# Patient Record
Sex: FEMALE | Race: WHITE | NOT HISPANIC OR LATINO | Employment: FULL TIME | URBAN - NONMETROPOLITAN AREA
[De-identification: names, ages, dates, MRNs, and addresses within clinical notes are randomized per-mention and may not be internally consistent; named-entity substitution may affect disease eponyms.]

---

## 2023-09-22 ENCOUNTER — TRANSCRIBE ORDERS (OUTPATIENT)
Dept: CARDIOLOGY | Facility: CLINIC | Age: 53
End: 2023-09-22
Payer: COMMERCIAL

## 2023-09-22 DIAGNOSIS — R06.02 SHORTNESS OF BREATH: ICD-10-CM

## 2023-09-22 DIAGNOSIS — R00.2 PALPITATIONS: ICD-10-CM

## 2023-10-05 PROBLEM — E66.3 OVERWEIGHT WITH BODY MASS INDEX (BMI) OF 27 TO 27.9 IN ADULT: Status: ACTIVE | Noted: 2023-10-05

## 2023-10-05 PROBLEM — J45.909 ASTHMA (HHS-HCC): Status: ACTIVE | Noted: 2023-10-05

## 2023-10-05 PROBLEM — R06.00 DYSPNEA: Status: ACTIVE | Noted: 2023-10-05

## 2023-10-05 PROBLEM — R00.2 PALPITATION: Status: ACTIVE | Noted: 2023-10-05

## 2023-10-05 RX ORDER — PHENOL/SODIUM PHENOLATE
1 AEROSOL, SPRAY (ML) MUCOUS MEMBRANE DAILY
COMMUNITY

## 2023-10-05 RX ORDER — CHOLECALCIFEROL (VITAMIN D3) 50 MCG
2000 TABLET ORAL DAILY
COMMUNITY

## 2023-10-05 RX ORDER — FLUTICASONE PROPIONATE AND SALMETEROL 250; 50 UG/1; UG/1
1 POWDER RESPIRATORY (INHALATION) EVERY 12 HOURS
COMMUNITY

## 2023-10-05 RX ORDER — LANOLIN ALCOHOL/MO/W.PET/CERES
1000 CREAM (GRAM) TOPICAL DAILY
COMMUNITY

## 2023-10-05 RX ORDER — TEZEPELUMAB-EKKO 210 MG/1.9ML
INJECTION, SOLUTION SUBCUTANEOUS
COMMUNITY

## 2023-10-06 ENCOUNTER — ANCILLARY PROCEDURE (OUTPATIENT)
Dept: CARDIOLOGY | Facility: CLINIC | Age: 53
End: 2023-10-06
Payer: COMMERCIAL

## 2023-10-06 DIAGNOSIS — R06.02 SHORTNESS OF BREATH: ICD-10-CM

## 2023-10-06 DIAGNOSIS — R00.2 PALPITATIONS: ICD-10-CM

## 2023-10-06 PROCEDURE — 93268 ECG RECORD/REVIEW: CPT | Performed by: INTERNAL MEDICINE

## 2023-11-17 ENCOUNTER — APPOINTMENT (OUTPATIENT)
Dept: CARDIOLOGY | Facility: CLINIC | Age: 53
End: 2023-11-17
Payer: COMMERCIAL

## 2023-11-29 ENCOUNTER — OFFICE VISIT (OUTPATIENT)
Dept: CARDIOLOGY | Facility: CLINIC | Age: 53
End: 2023-11-29
Payer: COMMERCIAL

## 2023-11-29 VITALS
BODY MASS INDEX: 28 KG/M2 | HEIGHT: 63 IN | HEART RATE: 80 BPM | WEIGHT: 158 LBS | DIASTOLIC BLOOD PRESSURE: 76 MMHG | SYSTOLIC BLOOD PRESSURE: 118 MMHG

## 2023-11-29 DIAGNOSIS — E66.3 OVERWEIGHT WITH BODY MASS INDEX (BMI) OF 27 TO 27.9 IN ADULT: ICD-10-CM

## 2023-11-29 DIAGNOSIS — R00.2 PALPITATION: Primary | ICD-10-CM

## 2023-11-29 DIAGNOSIS — R06.02 SHORTNESS OF BREATH: ICD-10-CM

## 2023-11-29 PROCEDURE — 99213 OFFICE O/P EST LOW 20 MIN: CPT | Performed by: NURSE PRACTITIONER

## 2023-11-29 PROCEDURE — 3008F BODY MASS INDEX DOCD: CPT | Performed by: NURSE PRACTITIONER

## 2023-11-29 PROCEDURE — 1036F TOBACCO NON-USER: CPT | Performed by: NURSE PRACTITIONER

## 2023-11-29 NOTE — PATIENT INSTRUCTIONS
Please bring all medicines, vitamins, and herbal supplements with you when you come to the office.    Prescriptions will not be filled unless you are compliant with your follow up appointments or have a follow up appointment scheduled as per instruction of your physician. Refills should be requested at the time of your visit.    PLAN:   Through informed decision making process incorporating patients unique circumstances, the following treatment plan will be initiated:    1.  Prescription drug management of cardiovascular medication for efficacy, adherence to treatment, side effect assessment and polypharmacy. Current treatment clinically warranted and to continue without modifications.    2.  Calcium Score (family history, shortness of breath)    3.  Try Magnesium Oxide 250mg take twice a day    4. Return for follow-up; in the interim, contact the office if new symptoms arise.  Dr. Gonsales 6 month

## 2023-12-02 ENCOUNTER — ANCILLARY PROCEDURE (OUTPATIENT)
Dept: RADIOLOGY | Facility: CLINIC | Age: 53
End: 2023-12-02
Payer: COMMERCIAL

## 2023-12-02 DIAGNOSIS — R06.02 SHORTNESS OF BREATH: ICD-10-CM

## 2023-12-02 PROCEDURE — 75571 CT HRT W/O DYE W/CA TEST: CPT

## 2023-12-02 ASSESSMENT — ENCOUNTER SYMPTOMS
ORTHOPNEA: 0
SYNCOPE: 0
PALPITATIONS: 0
IRREGULAR HEARTBEAT: 0
NEAR-SYNCOPE: 0
PND: 0
DYSPNEA ON EXERTION: 0

## 2023-12-02 NOTE — PROGRESS NOTES
"Chief Complaint  \" I just finished steroids from COVID\"    Reason for Visit  Testing follow-up.  Patient presents to the office today for outpatient follow-up.  Last evaluated in clinic by Dr. Altamirano September 2023.  At that time she was seen due to complaints of dyspnea and palpitations.  Subsequent BTNP 23, Mark of Hearts unremarkable.    Presents today ambulatory with steady gait.  Accompanied by patient  Patient denies any hospitalizations or significant changes to interval medical history since last office follow-up.     History of Present Illness   Pleasant 53-year-old female who presents to the office today for testing follow-up.  She has recently recovered from outpatient COVID illness, no adverse sequela.    She has started a new job with noted significant decrease in overall stress level.  She has noted that her anxiety is improving and palpitations have significantly declined.  She had one episode of palpitations where her heart rate was noted to be high at 4 AM on her Apple Watch.  Dyspnea noncardiac with BTNP 23.  Has history of asthma    An activity standpoint she completes ADLs, does a lot of lifting and going up and down ladders at her new job.  Does housework.  Denies exertional chest pain, no change in exercise capacity or functional tolerance.    No prior ischemic work-up:  August 2022 echo with no wall motion abnormality, EF 55%.    Cardiac risk profile:  Hypertension denies  Hyperlipidemia denies  Diabetes denies  Non-smoker  Positive family history of premature coronary artery disease in primary relatives.    Discussed availability of calcium score as an additional risk factor predictor, she is in agreement to proceed.  Otherwise, we discussed addition of OTC magnesium oxide for recurrent palpitations.  Reviewed recent Mark of VendAsta with no significant findings.    Patient reports that overall has no complaint(s) of chest pressure/discomfort, exertional chest pressure/discomfort, fatigue, " "irregular heart beat, lower extremity edema, orthopnea, and paroxysmal nocturnal dyspnea      Review of Systems   Cardiovascular:  Negative for chest pain, dyspnea on exertion, irregular heartbeat, leg swelling, near-syncope, orthopnea, palpitations, paroxysmal nocturnal dyspnea and syncope.        Visit Vitals  /76 (BP Location: Left arm, Patient Position: Sitting)   Pulse 80   Ht 1.6 m (5' 3\")   Wt 71.7 kg (158 lb)   BMI 27.99 kg/m²   Smoking Status Never   BSA 1.79 m²     Physical Exam  Vitals and nursing note reviewed.   HENT:      Head: Normocephalic.   Cardiovascular:      Rate and Rhythm: Normal rate and regular rhythm.      Heart sounds: Normal heart sounds.   Pulmonary:      Effort: Pulmonary effort is normal.      Breath sounds: Normal breath sounds.   Abdominal:      Palpations: Abdomen is soft.   Musculoskeletal:      Right lower leg: No edema.      Left lower leg: No edema.   Skin:     General: Skin is warm and dry.   Neurological:      General: No focal deficit present.      Mental Status: She is alert.   Psychiatric:         Mood and Affect: Mood normal.         Behavior: Behavior normal.        Allergies   Allergen Reactions    Bactrim [Sulfamethoxazole-Trimethoprim] Hives    Nitrofurantoin Monohyd/M-Cryst Hives    Naproxen Bleeding       Current Outpatient Medications   Medication Instructions    ascorbic acid (VITAMIN C) 100 mg, oral, Daily    cholecalciferol (VITAMIN D3) 2,000 Units, oral, Daily    cyanocobalamin (VITAMIN B-12) 1,000 mcg, oral, Daily    fluticasone propion-salmeteroL (Advair Diskus) 250-50 mcg/dose diskus inhaler 1 puff, inhalation, Every 12 hours    omeprazole (PriLOSEC) 20 mg tablet,delayed release (DR/EC) EC tablet 1 tablet, oral, Daily    tezepelumab-ekko (Tezspire) SubQ syringe 1 injection in office every 4 weeks <BR>      Assessment:    Palpitation  September 2023 30-day Mark of Nexercise  4 beat SVT at 129 bpm  11 beat  bpm  Complaints of palpitations associated " with PAC  Shortness of breath complaints sinus rhythm     Overweight with body mass index (BMI) of 27 to 27.9 in adult  Reviewed the merits of healthy lifestyle choices on overall cardiovascular health.      Shortness of breath  Sept 2023 BTNP 23  No correlation on Mark of Hearts with arrhythmia    Plan:     Through informed decision making process incorporating patients unique circumstances, the following treatment plan will be initiated:    1.  Prescription drug management of cardiovascular medication for efficacy, adherence to treatment, side effect assessment and polypharmacy. Current treatment clinically warranted and to continue without modifications.    2.  Calcium Score (family history, shortness of breath)    3.  Try Magnesium Oxide 250mg take twice a day    4. Return for follow-up; in the interim, contact the office if new symptoms arise.  Dr. Gonsales 6 month     Josefa Srinivasan  MSN, APRN-CNP, PMHNP-BC  [facility]    Please excuse any errors in grammar or translation related to this dictation. Voice recognition software was utilized to prepare this document.

## 2023-12-02 NOTE — ASSESSMENT & PLAN NOTE
September 2023 30-day Mark of Hearts  4 beat SVT at 129 bpm  11 beat  bpm  Complaints of palpitations associated with PAC  Shortness of breath complaints sinus rhythm

## 2023-12-04 ENCOUNTER — TELEPHONE (OUTPATIENT)
Dept: CARDIOLOGY | Facility: CLINIC | Age: 53
End: 2023-12-04
Payer: COMMERCIAL

## 2023-12-04 NOTE — TELEPHONE ENCOUNTER
----- Message from BASIL Osorio sent at 12/4/2023 12:08 PM EST -----  Please forward to PCP re: lung findings.  Let patient know her calcium score is zero; no additional testing   ----- Message -----  From: Interface, Radiology Results In  Sent: 12/4/2023  10:42 AM EST  To: BSAIL Osorio

## 2023-12-04 NOTE — TELEPHONE ENCOUNTER
Result Communication    Resulted Orders   CT cardiac scoring wo IV contrast    Addendum: 12/4/2023    Interpreted By:  Twan Stephenson,   ADDENDUM:  NON-CARDIOVASCULAR FINDINGS      INCLUDED LUNGS, AIRWAYS AND PLEURA  Endotracheal / endobronchial lesion: Negative  Nodule: Negative  Airspace disease: Negative  Pleural effusion: Negative  Pneumothorax: Negative  Other: Curvilinear bands of atelectasis or scarring in both lower  lobes. Three 6 mm or smaller subpleural left lower lobe nodules  versus foci of pleural thickening, few in the right lower lobe as  well. Favor simply atelectasis      INCLUDED NON-CARDIOVASCULAR INDIRA AND MEDIASTINUM  Adenopathy: Negative  Included esophagus: Unremarkable      Other: No acute or contributory unanticipated findings      INCLUDED BONES: No acute skeletal findings, noting less sensitivity  and specificity without dedicated sagittal and coronal reformatted  series.      INCLUDED CHEST WALL  No acute or contributory unanticipated findings      INCLUDED UPPER ABDOMEN  No acute or contributory unanticipated findings      -------      NON-CARDIOVASCULAR IMPRESSION      GIVEN THAT THEY ARE ALL DEPENDENT IN LOCATION, I SUSPECT SIMPLY A FEW  TINY AREAS OF ATELECTASIS RATHER THAN LESS THAN 6 MM SUBPLEURAL BASED  OR PLEURAL-BASED BILATERAL LOWER LOBE NODULES.  ACCORDING TO 2017  REVISION LUNG NODULE FOLLOW-UP GUIDELINES, ONE YEAR FOLLOW-UP CHEST  CT WOULD BE OPTIONAL, EVEN IN A HIGH RISK PATIENT (SMOKING OR  MALIGNANCY HISTORY); HOWEVER, THOSE RECOMMENDATIONS ARE BASED ON  REVIEW OF A COMPLETE CHEST CT. THE NEED, IF ANY, FOR NEAR TERM  FOLLOW-UP COMPLETE CHEST CT SHOULD BE BASED ON CLINICAL/RISK FACTORS      NOTE THIS ADDENDUM IS SOLELY FOR INTERPRETATION OF ANATOMY OUTSIDE  THE CARDIOVASCULAR SYSTEM. INTERPRETATION OF AND REPORTING OF THE  CARDIOVASCULAR STRUCTURES ARE THE SOLE RESPONSIBILITY OF THE  CARDIOLOGIST SUBMITTING THE ORIGINAL REPORT (NOT THIS ADDENDUM)      Signed by: Twan Stephenson  "12/4/2023 10:48 AM      -------- ORIGINAL REPORT --------  Dictation workstation:   [id number]      Narrative    Interpreted By:  Feliz Schumacher,   STUDY:  CT CARDIAC SCORING WO IV CONTRAST;  12/2/2023 12:40 pm      INDICATION:  Signs/Symptoms:shortness of breath, family history.      COMPARISON:  None.      ACCESSION NUMBER(S):  [id number]      ORDERING CLINICIAN:  FLORENCIA SANDRA      TECHNIQUE:  Using prospective ECG gating, CT scan of the coronary arteries was  performed without intravenous contrast. Coronary calcium scoring  was  performed according to the method of Agatston.      CT Dose-Length Product (DLP): 62.5 mGy*cm  CT Dose Reduction Employed: Yes, prospective gating, iterative  reconstruction.      FINDINGS:  The score and distribution of calcium in the coronary arteries is as  follows:      LM             0  LAD           0  LCx            0  RCA           0      Total         0      The visualized mid/lower ascending thoracic aorta measures 2.9 cm in  diameter. The heart is normal in size. No pericardial effusion is  present.        Impression    1. Coronary artery calcium score of  0*.      *Coronary artery calcium scoring may be helpful in predicting the  risk for future coronary heart disease events.  According to the  American College of Cardiology Foundation Clinical Expert Consensus  Task Force, such testing provides important prognostic information in  patients with more than one coronary heart disease risk factor. The  coronary artery calcium score correlates with the annual risk of a  non-fatal myocardial infarction or coronary heart disease death.      Coronary artery score            Annual Risk      0-99                               0.4%  100-399                          1.3%  >400                              2.4%      These three \"breakpoints\" correspond to lower, intermediate and high  risk states for future coronary events.  Such information should be  used, along with appropriate " clinical judgment, to make decisions  regarding the intensity of risk factor management strategies to treat  blood lipids and to modify other non-lipid coronary risk factors.      Reference: Celeste P et al. Circulation.  2007; 115:402-426      Reading Cardiologist: Dr. Felzi Schumacher, Date:  12/4/2023  10:40 am      Signed by: Feliz Schumacher 12/4/2023 10:40 AM  Dictation workstation:   [id number]       3:24 PM      Results were successfully communicated with the patient and they acknowledged their understanding. Faxed results to Dr Contreras Madrigal per patients request.

## 2024-01-09 ENCOUNTER — TELEPHONE (OUTPATIENT)
Dept: CARDIOLOGY | Facility: CLINIC | Age: 54
End: 2024-01-09
Payer: COMMERCIAL

## 2024-01-09 NOTE — TELEPHONE ENCOUNTER
Patient phoned asking to  calcium score testing results due to never forward over to Dr. Dinesh Madrigal. Sent message to [facility] office to print for patient for .

## 2024-06-19 ENCOUNTER — APPOINTMENT (OUTPATIENT)
Dept: CARDIOLOGY | Facility: CLINIC | Age: 54
End: 2024-06-19
Payer: COMMERCIAL

## 2024-06-19 VITALS
WEIGHT: 159 LBS | HEART RATE: 76 BPM | DIASTOLIC BLOOD PRESSURE: 66 MMHG | HEIGHT: 63 IN | BODY MASS INDEX: 28.17 KG/M2 | SYSTOLIC BLOOD PRESSURE: 110 MMHG

## 2024-06-19 DIAGNOSIS — R00.2 PALPITATION: ICD-10-CM

## 2024-06-19 DIAGNOSIS — Z78.9 NEVER SMOKED TOBACCO: ICD-10-CM

## 2024-06-19 DIAGNOSIS — E66.3 OVERWEIGHT WITH BODY MASS INDEX (BMI) OF 28 TO 28.9 IN ADULT: ICD-10-CM

## 2024-06-19 PROCEDURE — 3008F BODY MASS INDEX DOCD: CPT | Performed by: INTERNAL MEDICINE

## 2024-06-19 PROCEDURE — 1036F TOBACCO NON-USER: CPT | Performed by: INTERNAL MEDICINE

## 2024-06-19 PROCEDURE — 99213 OFFICE O/P EST LOW 20 MIN: CPT | Performed by: INTERNAL MEDICINE

## 2024-06-19 RX ORDER — POTASSIUM CHLORIDE 20 MEQ/1
20 TABLET, EXTENDED RELEASE ORAL DAILY
Qty: 90 TABLET | Refills: 3 | Status: SHIPPED | OUTPATIENT
Start: 2024-06-19 | End: 2025-06-19

## 2024-06-19 NOTE — LETTER
June 19, 2024     Tio Crandall DO  [address]  [facility]  [place] [address]    Patient: May Telles   YOB: 1970   Date of Visit: 6/19/2024       Dear Dr. Tio Crandall, :    Thank you for referring May Telles to me for evaluation. Below are my notes for this consultation.  If you have questions, please do not hesitate to call me. I look forward to following your patient along with you.       Sincerely,     Los Gonsales MD      CC: No Recipients  ______________________________________________________________________________________    Subjective   May Telles is a 54 y.o. female       Chief Complaint    Follow-up          HPI     Patient returns for follow-up of problems as noted.  She continues to have arrhythmia symptomatology.  Nurse practitioner suggested magnesium but she never started taking it.  I discussed magnesium supplementation versus potassium supplementation including the effect of these electrolytes and also the potential side effects.  After doing so she elects to not take magnesium but instead of potassium supplementation in hopes that modest potassium supplementation could attenuate or reduce her otherwise benign PACs and PVCs.  I discussed and explained the arrhythmias to her in great detail reviewed her event monitor and echocardiogram.  I advised her that no more aggressive intervention (antiarrhythmics) would be considered because of the risk benefit ratio.  She understands.  I pointed out to her that potassium supplementation hopefully will reduce the frequency and complexity of the irregular rhythm and attenuate her symptoms.  I believe that this is the best we have to offer and she understands and agrees to the proposed treatment.    I suggested that if she has relief she could henceforth get potassium refills from primary care.  We will tentatively schedule her follow-up with us in case other options was to be attempted  "(spironolactone).    I did advocate the merits of a modest diet and weight loss.        Vitals:    06/19/24 1556   BP: 110/66   BP Location: Left arm   Patient Position: Sitting   Pulse: 76   Weight: 72.1 kg (159 lb)   Height: 1.6 m (5' 3\")        Objective   Physical Exam  Constitutional:       Appearance: Normal appearance.   HENT:      Nose: Nose normal.   Neck:      Vascular: No carotid bruit.   Cardiovascular:      Rate and Rhythm: Normal rate.      Pulses: Normal pulses.      Heart sounds: Normal heart sounds.   Pulmonary:      Effort: Pulmonary effort is normal.   Abdominal:      General: Bowel sounds are normal.      Palpations: Abdomen is soft.   Musculoskeletal:         General: Normal range of motion.      Cervical back: Normal range of motion.      Right lower leg: No edema.      Left lower leg: No edema.   Skin:     General: Skin is warm and dry.   Neurological:      General: No focal deficit present.      Mental Status: She is alert.   Psychiatric:         Mood and Affect: Mood normal.         Behavior: Behavior normal.         Thought Content: Thought content normal.         Judgment: Judgment normal.         Allergies  Bactrim [sulfamethoxazole-trimethoprim], Nitrofurantoin monohyd/m-cryst, and Naproxen     Current Medications    Current Outpatient Medications:   •  ascorbic acid (Vitamin C) 100 mg tablet, Take 1 tablet (100 mg) by mouth once daily., Disp: , Rfl:   •  cholecalciferol (Vitamin D3) 50 MCG (2000 UT) tablet, Take 1 tablet (2,000 Units) by mouth once daily., Disp: , Rfl:   •  cyanocobalamin (Vitamin B-12) 1,000 mcg tablet, Take 1 tablet (1,000 mcg) by mouth once daily., Disp: , Rfl:   •  fluticasone propion-salmeteroL (Advair Diskus) 250-50 mcg/dose diskus inhaler, Inhale 1 puff every 12 hours., Disp: , Rfl:   •  omeprazole (PriLOSEC) 20 mg tablet,delayed release (DR/EC) EC tablet, Take 1 tablet (20 mg) by mouth once daily., Disp: , Rfl:                      Assessment/Plan   1. " Palpitation  Follow Up In Cardiology      2. Overweight with body mass index (BMI) of 28 to 28.9 in adult        3. Never smoked tobacco                 Scribe Attestation  By signing my name below, IGilma LPN, Scribe   attest that this documentation has been prepared under the direction and in the presence of Los Gonsales MD.     Provider Attestation - Scribe documentation    All medical record entries made by the Scribe were at my direction and personally dictated by me. I have reviewed the chart and agree that the record accurately reflects my personal performance of the history, physical exam, discussion and plan.

## 2024-06-19 NOTE — PATIENT INSTRUCTIONS
Please bring all medicines, vitamins, and herbal supplements with you when you come to the office.    Prescriptions will not be filled unless you are compliant with your follow up appointments or have a follow up appointment scheduled as per instruction of your physician. Refills should be requested at the time of your visit.     BMI was above normal measurement. Current weight: 72.1 kg (159 lb)  Weight change since last visit (-) denotes wt loss 1 lbs   Weight loss needed to achieve BMI 25: 18.2 Lbs  Weight loss needed to achieve BMI 30: -10 Lbs  Provided instructions on dietary changes  Provided instructions on exercise.    Follow up   Potassium Cl 20 meq daily

## 2024-06-19 NOTE — PROGRESS NOTES
"Subjective   May Telles is a 54 y.o. female       Chief Complaint    Follow-up          HPI     Patient returns for follow-up of problems as noted.  She continues to have arrhythmia symptomatology.  Nurse practitioner suggested magnesium but she never started taking it.  I discussed magnesium supplementation versus potassium supplementation including the effect of these electrolytes and also the potential side effects.  After doing so she elects to not take magnesium but instead of potassium supplementation in hopes that modest potassium supplementation could attenuate or reduce her otherwise benign PACs and PVCs.  I discussed and explained the arrhythmias to her in great detail reviewed her event monitor and echocardiogram.  I advised her that no more aggressive intervention (antiarrhythmics) would be considered because of the risk benefit ratio.  She understands.  I pointed out to her that potassium supplementation hopefully will reduce the frequency and complexity of the irregular rhythm and attenuate her symptoms.  I believe that this is the best we have to offer and she understands and agrees to the proposed treatment.    I suggested that if she has relief she could henceforth get potassium refills from primary care.  We will tentatively schedule her follow-up with us in case other options was to be attempted (spironolactone).    I did advocate the merits of a modest diet and weight loss.        Vitals:    06/19/24 1556   BP: 110/66   BP Location: Left arm   Patient Position: Sitting   Pulse: 76   Weight: 72.1 kg (159 lb)   Height: 1.6 m (5' 3\")        Objective   Physical Exam  Constitutional:       Appearance: Normal appearance.   HENT:      Nose: Nose normal.   Neck:      Vascular: No carotid bruit.   Cardiovascular:      Rate and Rhythm: Normal rate.      Pulses: Normal pulses.      Heart sounds: Normal heart sounds.   Pulmonary:      Effort: Pulmonary effort is normal.   Abdominal:      General: Bowel " sounds are normal.      Palpations: Abdomen is soft.   Musculoskeletal:         General: Normal range of motion.      Cervical back: Normal range of motion.      Right lower leg: No edema.      Left lower leg: No edema.   Skin:     General: Skin is warm and dry.   Neurological:      General: No focal deficit present.      Mental Status: She is alert.   Psychiatric:         Mood and Affect: Mood normal.         Behavior: Behavior normal.         Thought Content: Thought content normal.         Judgment: Judgment normal.         Allergies  Bactrim [sulfamethoxazole-trimethoprim], Nitrofurantoin monohyd/m-cryst, and Naproxen     Current Medications    Current Outpatient Medications:     ascorbic acid (Vitamin C) 100 mg tablet, Take 1 tablet (100 mg) by mouth once daily., Disp: , Rfl:     cholecalciferol (Vitamin D3) 50 MCG (2000 UT) tablet, Take 1 tablet (2,000 Units) by mouth once daily., Disp: , Rfl:     cyanocobalamin (Vitamin B-12) 1,000 mcg tablet, Take 1 tablet (1,000 mcg) by mouth once daily., Disp: , Rfl:     fluticasone propion-salmeteroL (Advair Diskus) 250-50 mcg/dose diskus inhaler, Inhale 1 puff every 12 hours., Disp: , Rfl:     omeprazole (PriLOSEC) 20 mg tablet,delayed release (DR/EC) EC tablet, Take 1 tablet (20 mg) by mouth once daily., Disp: , Rfl:                      Assessment/Plan   1. Palpitation  Follow Up In Cardiology      2. Overweight with body mass index (BMI) of 28 to 28.9 in adult        3. Never smoked tobacco                 Scribe Attestation  By signing my name below, Gilma HE LPN, Scribe   attest that this documentation has been prepared under the direction and in the presence of Los Gonsales MD.     Provider Attestation - Scribe documentation    All medical record entries made by the Scribe were at my direction and personally dictated by me. I have reviewed the chart and agree that the record accurately reflects my personal performance of the history, physical exam,  discussion and plan.

## 2024-12-11 ENCOUNTER — APPOINTMENT (OUTPATIENT)
Dept: CARDIOLOGY | Facility: CLINIC | Age: 54
End: 2024-12-11
Payer: COMMERCIAL

## 2024-12-11 VITALS
BODY MASS INDEX: 28.35 KG/M2 | HEART RATE: 60 BPM | SYSTOLIC BLOOD PRESSURE: 102 MMHG | HEIGHT: 63 IN | DIASTOLIC BLOOD PRESSURE: 72 MMHG | WEIGHT: 160 LBS

## 2024-12-11 DIAGNOSIS — E66.3 OVERWEIGHT WITH BODY MASS INDEX (BMI) OF 28 TO 28.9 IN ADULT: Primary | ICD-10-CM

## 2024-12-11 DIAGNOSIS — R00.2 PALPITATION: ICD-10-CM

## 2024-12-11 PROCEDURE — 1036F TOBACCO NON-USER: CPT | Performed by: NURSE PRACTITIONER

## 2024-12-11 PROCEDURE — 3008F BODY MASS INDEX DOCD: CPT | Performed by: NURSE PRACTITIONER

## 2024-12-11 PROCEDURE — 99213 OFFICE O/P EST LOW 20 MIN: CPT | Performed by: NURSE PRACTITIONER

## 2024-12-11 RX ORDER — MEPOLIZUMAB 100 MG/ML
100 INJECTION, SOLUTION SUBCUTANEOUS
COMMUNITY

## 2024-12-11 ASSESSMENT — ENCOUNTER SYMPTOMS
PND: 0
PALPITATIONS: 1
IRREGULAR HEARTBEAT: 0
SYNCOPE: 0
DYSPNEA ON EXERTION: 0
ORTHOPNEA: 0
NEAR-SYNCOPE: 0

## 2024-12-11 NOTE — PROGRESS NOTES
"Chief Complaint  \" I been having more palpitations\"    Reason for Visit  6-month follow-up  Patient presents to the office today for outpatient follow-up for palpitations  Last evaluated in clinic by Dr. Altamirano June 2024.  At that time, he had a potassium supplement but she was unable to tolerate.    Presents today ambulatory with steady gait.  Accompanied by patient    History of Present Illness   Patient is a pleasant 54-year-old female who reports a COVID illness July 2024, she was not hospitalized.  She reports that since that time she has noted an increase in frequency and intensity of palpitations.  They seem to occur randomly with exertion and also at rest.  They last for couple seconds and she feels\" a fluttering and need to take a deep breath\".  Sometimes she can feel it beating up into her throat.  She feels like they have significantly increased from her last evaluation and [employer].  She noticed them a lot when she lies on her left side at night.    She drinks half a cup of caffeine, no alcohol.  Her TSH has been checked this year.  No prior sleep study.    She remains on treatment for her asthma, denies increased rescue inhaler use.  Is currently undergoing evaluation by rheumatology.    Otherwise, she completes housework, goes to the grocery stores.  Climbs a ladder on a regular basis.  Palpitations are not exertionally related.  She denies exertional chest pain, no shortness of breath.  She has noted some slight increase in dependent edema.    Patient reports that overall has no complaint(s) of chest pain, chest pressure/discomfort, claudication, exertional chest pressure/discomfort, and near-syncope    Daily activity:    Greater than 4 METS  Denies any change in exercise capacity or functional tolerance since last office visit.    She was intolerant to magnesium in the past.  No prior AV dominique blocking agents    She did have 2 brief episodes of SVT on prior [employer] monitor.  Question of " "increasing in frequency and will therefore check a 14-day [employer].  She feels symptoms are occurring more days than not.    Review of Systems   Cardiovascular:  Positive for palpitations. Negative for chest pain, dyspnea on exertion, irregular heartbeat, leg swelling, near-syncope, orthopnea, paroxysmal nocturnal dyspnea and syncope.        Visit Vitals  /72 (BP Location: Left arm, Patient Position: Sitting)   Pulse 60   Ht 1.6 m (5' 3\")   Wt 72.6 kg (160 lb)   BMI 28.34 kg/m²   Smoking Status Never   BSA 1.8 m²     Physical Exam  Vitals and nursing note reviewed.   HENT:      Head: Normocephalic.   Cardiovascular:      Rate and Rhythm: Normal rate and regular rhythm.      Heart sounds: Normal heart sounds.   Pulmonary:      Effort: Pulmonary effort is normal.      Breath sounds: Normal breath sounds.   Abdominal:      Palpations: Abdomen is soft.   Musculoskeletal:      Right lower leg: No edema.      Left lower leg: No edema.   Skin:     General: Skin is warm and dry.   Neurological:      General: No focal deficit present.      Mental Status: She is alert.   Psychiatric:         Mood and Affect: Mood normal.         Behavior: Behavior normal.        Allergies   Allergen Reactions    Bactrim [Sulfamethoxazole-Trimethoprim] Hives    Nitrofurantoin Monohyd/M-Cryst Hives    Naproxen Bleeding     Current Outpatient Medications   Medication Instructions    ascorbic acid (VITAMIN C) 100 mg, Daily    cholecalciferol (VITAMIN D3) 2,000 Units, Daily    cyanocobalamin (VITAMIN B-12) 1,000 mcg, Daily    fluticasone propion-salmeteroL (Advair Diskus) 250-50 mcg/dose diskus inhaler 1 puff, Every 12 hours    Nucala 100 mg, Every 28 days    omeprazole (PriLOSEC) 20 mg tablet,delayed release (DR/EC) EC tablet 1 tablet, Daily      Assessment:    Cardiac and Vasculature  December 2023 calcium score 0    August 2022 TTE  LVEF 55 to 60%, no structural abnormalities    Palpitation  September 2023 30-day [employer]  4 " beat SVT at 129 bpm  11 beat  bpm  Complaints of palpitations associated with PAC  Shortness of breath complaints sinus rhythm        Since COVID illness July 2024 has noticed increasing palpitations, almost daily.  HR has been elevated     Overweight with body mass index (BMI) of 28 to 28.9 in adult  Reviewed the merits of healthy lifestyle choices on overall cardiovascular health.      Plan:     Through informed decision making process incorporating patients unique circumstances, the following treatment plan will be initiated:    1.  Prescription drug management of cardiovascular medication for efficacy, adherence to treatment, side effect assessment and polypharmacy. Current treatment clinically warranted and to continue without modifications.    2.  14 day KOH (increased palpitations)    3.  Return for follow-up; in the interim, contact the office if new symptoms arise.  Dr. Harris 9 months     Josefa Srinivasan  MSN, APRN-CNP, PMHNP-BC  [facility]    Please excuse any errors in grammar or translation related to this dictation. Voice recognition software was utilized to prepare this document.

## 2024-12-11 NOTE — LETTER
"December 11, 2024     Tio Crandall,   [address]  [facility]  [place] [address]    Patient: May Telles   YOB: 1970   Date of Visit: 12/11/2024       Dear Dr. Tio Crandall, :    Thank you for referring May Telles to me for evaluation. Below are my notes for this consultation.  If you have questions, please do not hesitate to call me. I look forward to following your patient along with you.       Sincerely,     Josefa Srinivasan, APRN-CNP      CC: No Recipients  ______________________________________________________________________________________    Chief Complaint  \" I been having more palpitations\"    Reason for Visit  6-month follow-up  Patient presents to the office today for outpatient follow-up for palpitations  Last evaluated in clinic by Dr. Altamirano June 2024.  At that time, he had a potassium supplement but she was unable to tolerate.    Presents today ambulatory with steady gait.  Accompanied by patient    History of Present Illness   Patient is a pleasant 54-year-old female who reports a COVID illness July 2024, she was not hospitalized.  She reports that since that time she has noted an increase in frequency and intensity of palpitations.  They seem to occur randomly with exertion and also at rest.  They last for couple seconds and she feels\" a fluttering and need to take a deep breath\".  Sometimes she can feel it beating up into her throat.  She feels like they have significantly increased from her last evaluation and [employer].  She noticed them a lot when she lies on her left side at night.    She drinks half a cup of caffeine, no alcohol.  Her TSH has been checked this year.  No prior sleep study.    She remains on treatment for her asthma, denies increased rescue inhaler use.  Is currently undergoing evaluation by rheumatology.    Otherwise, she completes housework, goes to the grocery stores.  Climbs a ladder on a regular basis.  " "Palpitations are not exertionally related.  She denies exertional chest pain, no shortness of breath.  She has noted some slight increase in dependent edema.    Patient reports that overall has no complaint(s) of chest pain, chest pressure/discomfort, claudication, exertional chest pressure/discomfort, and near-syncope    Daily activity:    Greater than 4 METS  Denies any change in exercise capacity or functional tolerance since last office visit.    She was intolerant to magnesium in the past.  No prior AV dominique blocking agents    She did have 2 brief episodes of SVT on prior [employer] monitor.  Question of increasing in frequency and will therefore check a 14-day [employer].  She feels symptoms are occurring more days than not.    Review of Systems   Cardiovascular:  Positive for palpitations. Negative for chest pain, dyspnea on exertion, irregular heartbeat, leg swelling, near-syncope, orthopnea, paroxysmal nocturnal dyspnea and syncope.        Visit Vitals  /72 (BP Location: Left arm, Patient Position: Sitting)   Pulse 60   Ht 1.6 m (5' 3\")   Wt 72.6 kg (160 lb)   BMI 28.34 kg/m²   Smoking Status Never   BSA 1.8 m²     Physical Exam  Vitals and nursing note reviewed.   HENT:      Head: Normocephalic.   Cardiovascular:      Rate and Rhythm: Normal rate and regular rhythm.      Heart sounds: Normal heart sounds.   Pulmonary:      Effort: Pulmonary effort is normal.      Breath sounds: Normal breath sounds.   Abdominal:      Palpations: Abdomen is soft.   Musculoskeletal:      Right lower leg: No edema.      Left lower leg: No edema.   Skin:     General: Skin is warm and dry.   Neurological:      General: No focal deficit present.      Mental Status: She is alert.   Psychiatric:         Mood and Affect: Mood normal.         Behavior: Behavior normal.        Allergies   Allergen Reactions   • Bactrim [Sulfamethoxazole-Trimethoprim] Hives   • Nitrofurantoin Monohyd/M-Cryst Hives   • Naproxen Bleeding "     Current Outpatient Medications   Medication Instructions   • ascorbic acid (VITAMIN C) 100 mg, Daily   • cholecalciferol (VITAMIN D3) 2,000 Units, Daily   • cyanocobalamin (VITAMIN B-12) 1,000 mcg, Daily   • fluticasone propion-salmeteroL (Advair Diskus) 250-50 mcg/dose diskus inhaler 1 puff, Every 12 hours   • Nucala 100 mg, Every 28 days   • omeprazole (PriLOSEC) 20 mg tablet,delayed release (DR/EC) EC tablet 1 tablet, Daily      Assessment:    Cardiac and Vasculature  December 2023 calcium score 0    August 2022 TTE  LVEF 55 to 60%, no structural abnormalities    Palpitation  September 2023 30-day [facility] [employer]  4 beat SVT at 129 bpm  11 beat  bpm  Complaints of palpitations associated with PAC  Shortness of breath complaints sinus rhythm        Since COVID illness July 2024 has noticed increasing palpitations, almost daily.  HR has been elevated     Overweight with body mass index (BMI) of 28 to 28.9 in adult  Reviewed the merits of healthy lifestyle choices on overall cardiovascular health.      Plan:     Through informed decision making process incorporating patients unique circumstances, the following treatment plan will be initiated:    1.  Prescription drug management of cardiovascular medication for efficacy, adherence to treatment, side effect assessment and polypharmacy. Current treatment clinically warranted and to continue without modifications.    2.  14 day KOH (increased palpitations)    3.  Return for follow-up; in the interim, contact the office if new symptoms arise.  Dr. Harris 9 months     Josefa Srinivasan  MSN, APRN-CNP, PMHNP-BC  [facility]    Please excuse any errors in grammar or translation related to this dictation. Voice recognition software was utilized to prepare this document.

## 2024-12-11 NOTE — ASSESSMENT & PLAN NOTE
September 2023 30-day [facility] [employer]  4 beat SVT at 129 bpm  11 beat  bpm  Complaints of palpitations associated with PAC  Shortness of breath complaints sinus rhythm        Since COVID illness July 2024 has noticed increasing palpitations, almost daily.  HR has been elevated

## 2024-12-11 NOTE — PATIENT INSTRUCTIONS
Please bring all medicines, vitamins, and herbal supplements with you when you come to the office.    Prescriptions will not be filled unless you are compliant with your follow up appointments or have a follow up appointment scheduled as per instruction of your physician. Refills should be requested at the time of your visit.     PLAN:   Through informed decision making process incorporating patients unique circumstances, the following treatment plan will be initiated:    1.  Prescription drug management of cardiovascular medication for efficacy, adherence to treatment, side effect assessment and polypharmacy. Current treatment clinically warranted and to continue without modifications.    2.  14 day KOH (increased palpitations)    3.  Return for follow-up; in the interim, contact the office if new symptoms arise.  Dr. Harris 9 months

## 2024-12-17 ENCOUNTER — APPOINTMENT (OUTPATIENT)
Dept: CARDIOLOGY | Facility: CLINIC | Age: 54
End: 2024-12-17
Payer: COMMERCIAL

## 2024-12-17 DIAGNOSIS — R00.2 PALPITATION: ICD-10-CM

## 2024-12-27 ENCOUNTER — TELEPHONE (OUTPATIENT)
Dept: CARDIOLOGY | Facility: CLINIC | Age: 54
End: 2024-12-27
Payer: COMMERCIAL

## 2024-12-27 NOTE — TELEPHONE ENCOUNTER
Patient phoned that she spoke with gutierrez richards about her skin being irritated and blistered from the stickies and was suggested to remove koh and send back due to the irritation and would not have other stickies to replace before holter was completed. Patient removed and mailing back tomorrow. FYI.    TO Josefa Fajardo NP

## 2025-01-10 PROCEDURE — 93272 ECG/REVIEW INTERPRET ONLY: CPT | Performed by: INTERNAL MEDICINE

## 2025-01-13 ENCOUNTER — TELEPHONE (OUTPATIENT)
Dept: CARDIOLOGY | Facility: CLINIC | Age: 55
End: 2025-01-13
Payer: COMMERCIAL

## 2025-01-13 DIAGNOSIS — R00.2 PALPITATIONS: ICD-10-CM

## 2025-01-13 RX ORDER — LANOLIN ALCOHOL/MO/W.PET/CERES
400 CREAM (GRAM) TOPICAL DAILY
Qty: 90 TABLET | Refills: 3 | Status: SHIPPED | OUTPATIENT
Start: 2025-01-13 | End: 2026-01-13

## 2025-01-13 NOTE — TELEPHONE ENCOUNTER
----- Message from Josefa Srinivasan sent at 1/13/2025  9:03 AM EST -----  Please let her know the holter monitor did not show any SVT.  There are occasional extra beats and faster heart rates but nothing corresponded to symptoms.  Try magnesium oxide 400mg daily to see if helps with her complaints of palpitations.    If symptoms continue then may need 30 day event monitor -  update office if continued concerns.  ----- Message -----  From: Lynn Harris MD  Sent: 1/10/2025   2:31 PM EST  To: TING Osorio-CNP

## 2025-01-13 NOTE — TELEPHONE ENCOUNTER
Result Communication    Resulted Orders   Holter Or Event Cardiac Monitor    Narrative    Event monitoring and 54-year-old with palpitations.    There were 26 patient triggered episodes and 1 auto triggered event.  Rhythm consisted of sinus rhythm with rates ranging from 63 to 213 bpm.  There were isolated ventricular and supraventricular premature beats and 1   atrial couplet.  Shortness of breath and heart fluttering/racing corresponded mostly to   sinus rhythm and at times to sinus tachycardia..  There were episodes where her heart rate was up to 213 bpm, with no   symptoms.  There was 2 symptom triggers associated with PVC.      Summary:  1.  Predominant rhythm sinus.  2.  Symptoms are markedly out of proportion to objective findings.  3.  Isolated ventricular and supraventricular premature beats were noted  4.  Symptoms corresponded sometimes to sinus rhythm sometimes to sinus   tachycardia and yet there was sinus tachycardia not associated with   symptoms  5.  Activity log was not available  6.  Isolated ventricular and supraventricular premature beats were noted.  7.  No atrial fibrillation, flutter or AV block

## 2025-01-13 NOTE — TELEPHONE ENCOUNTER
Informed patient of results and recommendations to start magnesium oxide 400 mg daily, she verbalized understanding. Order prepped and sent to Josefa Fajardo NP for signature     Patient states that she continues to experience lightheadedness since the first week of January. Reports she just left rheutamlogist office and bp was 118/51 and they instructed her to call cardiologist with concerns. Please advise on further recommendations.

## 2025-01-16 NOTE — TELEPHONE ENCOUNTER
Spoke with patient, advised patient to check BP when she is experiencing lightheadedness. She also noted she started magnesium yesterday (01.15.25), and is staying well hydrated.

## 2025-08-21 ENCOUNTER — TELEPHONE (OUTPATIENT)
Dept: GENETICS | Facility: CLINIC | Age: 55
End: 2025-08-21
Payer: COMMERCIAL

## 2025-09-15 ENCOUNTER — APPOINTMENT (OUTPATIENT)
Dept: CARDIOLOGY | Facility: CLINIC | Age: 55
End: 2025-09-15
Payer: COMMERCIAL

## 2025-09-22 ENCOUNTER — APPOINTMENT (OUTPATIENT)
Dept: CARDIOLOGY | Facility: CLINIC | Age: 55
End: 2025-09-22

## 2025-11-14 ENCOUNTER — APPOINTMENT (OUTPATIENT)
Dept: GENETICS | Facility: CLINIC | Age: 55
End: 2025-11-14
Payer: COMMERCIAL